# Patient Record
(demographics unavailable — no encounter records)

---

## 2024-11-09 NOTE — HISTORY OF PRESENT ILLNESS
[FreeTextEntry1] : The patient is here for a routine visit.  [de-identified] : She is taking Zepbound, recently increased to 7.5 mg.  She has had a 19 pound weight loss. Her diet is healthy and she walks daily. No chest pain or dyspnea.  She has B/L arm and shoulder pains. No trauma or swelling.

## 2024-11-09 NOTE — ASSESSMENT
[FreeTextEntry1] : The BP is very good at 118/60.  Discussed diet and exercise. On Losartan 100 mg.   She has had a very good weight loss with Zepbound and she is now on 7.5 mg.    Lipids are elevated- she is off the statin due to pains but it didn't improve off the statin so will restart a statin.  Start Rosuvastatin 5 mg QD.  The B/L shoulder pains could be orthopedic but will also check an ESR and CRP to r/o PMR.  Consider an orthopedic evaluation if it persists.  Recheck a WBC which was 10.9.  She had COVID-19 recently-defer the vaccine for now.  S/p flu vaccine and Shingrix.   Colonoscopy and EGD 12/21.  She sees a gyn.  Mammogram and breast U/S 2/24.    She saw Dr. Wright for the osteoporosis and is having testing.  She had a cardiology evaluation last year.  She can do a follow-up chest CT for the previous history of lung nodules.

## 2024-11-09 NOTE — HISTORY OF PRESENT ILLNESS
[FreeTextEntry1] : The patient is here for a routine visit.  [de-identified] : She is taking Zepbound, recently increased to 7.5 mg.  She has had a 19 pound weight loss. Her diet is healthy and she walks daily. No chest pain or dyspnea.  She has B/L arm and shoulder pains. No trauma or swelling.

## 2024-11-27 NOTE — HISTORY OF PRESENT ILLNESS
[Rectal Prolapse] : no [Unable To Restrain Bowel Movement] : no [Urinary Tract Infection] : no [FreeTextEntry1] :  Justine presents for follow up with a h/o DO, UUI, BERNARDO and prolapse. For her prolapse and BERNARDO she is s/p surgery with a RA-MARIA G, BSO, SC, TVT and cysto 6/20/24. Today she reports being very satisfied with surgical results.

## 2024-11-27 NOTE — DISCUSSION/SUMMARY
[FreeTextEntry1] :  -Prolapse and BERNARDO resolved -Denies bothersome DO/UUI symptoms -Reports bothersome atrophy, atrophy visualized on exam. Will restart vaginal estrogen, Rx provided -RTO as needed. Continue routine GYN care

## 2024-11-27 NOTE — PHYSICAL EXAM
[Chaperone Present] : A chaperone was present in the examining room during all aspects of the physical examination [24944] : A chaperone was present during the pelvic exam. [No Acute Distress] : in no acute distress [Oriented x3] : oriented to person, place, and time [Normal Memory] : ~T memory was ~L unimpaired [Normal Mood/Affect] : mood and affect are normal [Labia Majora] : were normal [Absent] : absent [Normal] : no abnormalities [Anxiety] : patient is not anxious [Tenderness] : ~T no ~M abdominal tenderness observed [Distended] : not distended [FreeTextEntry4] : no mesh exposure [de-identified] : no prolapse recurrence

## 2025-02-18 NOTE — HISTORY OF PRESENT ILLNESS
[FreeTextEntry1] : The patient is here for a routine visit.  [de-identified] : She is doing very well on the Zepbound with a  pound weight loss.  She is at her goal weight.  Diet is healthy and she is active.    She can have GI side effects from the Zepbound.  There is a lot of stress at home.

## 2025-02-18 NOTE — ASSESSMENT
[FreeTextEntry1] : The BP is very good on Losartan at 125/70.  She has had very good weight loss on Zepbound and will now maintain her current weight.  She is on Zepbound 10 mg which she still has at home.  She will take 10 mg every other week for now and plan to reduce to 5 mg when she runs out of the 10 mg.  Call PRN if she continues to lose weight or if side effects become a problem.  Check a HgBA1c.  Mammogram pending soon.  Check lipids on Rosuvastatin 5 mg which she is tolerating.    She saw Dr. Wright and she is on Alendronate.